# Patient Record
Sex: MALE | Race: WHITE | NOT HISPANIC OR LATINO | Employment: OTHER | URBAN - METROPOLITAN AREA
[De-identification: names, ages, dates, MRNs, and addresses within clinical notes are randomized per-mention and may not be internally consistent; named-entity substitution may affect disease eponyms.]

---

## 2019-06-21 ENCOUNTER — TRANSFERRED RECORDS (OUTPATIENT)
Dept: HEALTH INFORMATION MANAGEMENT | Facility: CLINIC | Age: 84
End: 2019-06-21

## 2019-07-19 ENCOUNTER — TRANSFERRED RECORDS (OUTPATIENT)
Dept: HEALTH INFORMATION MANAGEMENT | Facility: CLINIC | Age: 84
End: 2019-07-19

## 2019-07-19 DIAGNOSIS — M25.562 KNEE PAIN, LEFT: ICD-10-CM

## 2019-07-19 PROCEDURE — 73562 X-RAY EXAM OF KNEE 3: CPT | Mod: TC,LT,FY

## 2024-01-01 ENCOUNTER — HOSPITAL ENCOUNTER (EMERGENCY)
Facility: HOSPITAL | Age: 89
Discharge: ANOTHER HOSPITAL | End: 2024-12-28
Attending: NURSE PRACTITIONER | Admitting: NURSE PRACTITIONER
Payer: MEDICARE

## 2024-01-01 VITALS
RESPIRATION RATE: 25 BRPM | TEMPERATURE: 99.6 F | OXYGEN SATURATION: 94 % | SYSTOLIC BLOOD PRESSURE: 103 MMHG | DIASTOLIC BLOOD PRESSURE: 77 MMHG | HEART RATE: 66 BPM

## 2024-01-01 DIAGNOSIS — A41.9 SEPSIS (H): ICD-10-CM

## 2024-01-01 DIAGNOSIS — R33.9 URINARY RETENTION: ICD-10-CM

## 2024-01-01 DIAGNOSIS — K56.41 FECAL IMPACTION (H): ICD-10-CM

## 2024-01-01 LAB
ALBUMIN SERPL BCG-MCNC: 3.7 G/DL (ref 3.5–5.2)
ALBUMIN UR-MCNC: 20 MG/DL
ALP SERPL-CCNC: 50 U/L (ref 40–150)
ALT SERPL W P-5'-P-CCNC: 12 U/L (ref 0–70)
ANION GAP SERPL CALCULATED.3IONS-SCNC: 12 MMOL/L (ref 7–15)
APPEARANCE UR: ABNORMAL
AST SERPL W P-5'-P-CCNC: 24 U/L (ref 0–45)
BACTERIA #/AREA URNS HPF: ABNORMAL /HPF
BASE EXCESS BLDV CALC-SCNC: 2 MMOL/L (ref -3–3)
BILIRUB SERPL-MCNC: 0.4 MG/DL
BILIRUB UR QL STRIP: NEGATIVE
BUN SERPL-MCNC: 32.1 MG/DL (ref 8–23)
CALCIUM SERPL-MCNC: 9.9 MG/DL (ref 8.8–10.4)
CHLORIDE SERPL-SCNC: 103 MMOL/L (ref 98–107)
COLOR UR AUTO: YELLOW
CREAT SERPL-MCNC: 1.03 MG/DL (ref 0.67–1.17)
EGFRCR SERPLBLD CKD-EPI 2021: 68 ML/MIN/1.73M2
ERYTHROCYTE [DISTWIDTH] IN BLOOD BY AUTOMATED COUNT: 12.3 % (ref 10–15)
GLUCOSE SERPL-MCNC: 144 MG/DL (ref 70–99)
GLUCOSE UR STRIP-MCNC: NEGATIVE MG/DL
HCO3 BLDV-SCNC: 25 MMOL/L (ref 21–28)
HCO3 SERPL-SCNC: 24 MMOL/L (ref 22–29)
HCT VFR BLD AUTO: 33.4 % (ref 40–53)
HGB BLD-MCNC: 10.9 G/DL (ref 13.3–17.7)
HGB UR QL STRIP: ABNORMAL
HOLD SPECIMEN: NORMAL
KETONES UR STRIP-MCNC: ABNORMAL MG/DL
LACTATE SERPL-SCNC: 1.2 MMOL/L (ref 0.7–2)
LEUKOCYTE ESTERASE UR QL STRIP: ABNORMAL
MCH RBC QN AUTO: 33.4 PG (ref 26.5–33)
MCHC RBC AUTO-ENTMCNC: 32.6 G/DL (ref 31.5–36.5)
MCV RBC AUTO: 103 FL (ref 78–100)
MUCOUS THREADS #/AREA URNS LPF: PRESENT /LPF
NITRATE UR QL: NEGATIVE
O2/TOTAL GAS SETTING VFR VENT: 21 %
OXYHGB MFR BLDV: 93 % (ref 70–75)
PCO2 BLDV: 34 MM HG (ref 40–50)
PH BLDV: 7.48 [PH] (ref 7.32–7.43)
PH UR STRIP: 7 [PH] (ref 4.7–8)
PLATELET # BLD AUTO: 201 10E3/UL (ref 150–450)
PO2 BLDV: 67 MM HG (ref 25–47)
POTASSIUM SERPL-SCNC: 3.6 MMOL/L (ref 3.4–5.3)
PROCALCITONIN SERPL IA-MCNC: 13.59 NG/ML
PROT SERPL-MCNC: 6.3 G/DL (ref 6.4–8.3)
RBC # BLD AUTO: 3.26 10E6/UL (ref 4.4–5.9)
RBC URINE: 79 /HPF
SAO2 % BLDV: 95 % (ref 70–75)
SODIUM SERPL-SCNC: 139 MMOL/L (ref 135–145)
SP GR UR STRIP: 1.02 (ref 1–1.03)
SQUAMOUS EPITHELIAL: 0 /HPF
TSH SERPL DL<=0.005 MIU/L-ACNC: 2.11 UIU/ML (ref 0.3–4.2)
UROBILINOGEN UR STRIP-MCNC: 3 MG/DL
WBC # BLD AUTO: 26.6 10E3/UL (ref 4–11)
WBC URINE: 6 /HPF

## 2024-01-01 PROCEDURE — 96367 TX/PROPH/DG ADDL SEQ IV INF: CPT

## 2024-01-01 PROCEDURE — 85027 COMPLETE CBC AUTOMATED: CPT | Performed by: NURSE PRACTITIONER

## 2024-01-01 PROCEDURE — 250N000011 HC RX IP 250 OP 636: Performed by: INTERNAL MEDICINE

## 2024-01-01 PROCEDURE — 250N000011 HC RX IP 250 OP 636: Performed by: NURSE PRACTITIONER

## 2024-01-01 PROCEDURE — 83605 ASSAY OF LACTIC ACID: CPT | Performed by: NURSE PRACTITIONER

## 2024-01-01 PROCEDURE — 87040 BLOOD CULTURE FOR BACTERIA: CPT | Performed by: NURSE PRACTITIONER

## 2024-01-01 PROCEDURE — 82805 BLOOD GASES W/O2 SATURATION: CPT | Performed by: NURSE PRACTITIONER

## 2024-01-01 PROCEDURE — 96366 THER/PROPH/DIAG IV INF ADDON: CPT

## 2024-01-01 PROCEDURE — 99285 EMERGENCY DEPT VISIT HI MDM: CPT | Mod: 25

## 2024-01-01 PROCEDURE — 96365 THER/PROPH/DIAG IV INF INIT: CPT | Mod: XU

## 2024-01-01 PROCEDURE — 87077 CULTURE AEROBIC IDENTIFY: CPT | Performed by: NURSE PRACTITIONER

## 2024-01-01 PROCEDURE — 96375 TX/PRO/DX INJ NEW DRUG ADDON: CPT

## 2024-01-01 PROCEDURE — 87181 SC STD AGAR DILUTION PER AGT: CPT | Performed by: NURSE PRACTITIONER

## 2024-01-01 PROCEDURE — 51702 INSERT TEMP BLADDER CATH: CPT

## 2024-01-01 PROCEDURE — 36415 COLL VENOUS BLD VENIPUNCTURE: CPT | Performed by: NURSE PRACTITIONER

## 2024-01-01 PROCEDURE — 99285 EMERGENCY DEPT VISIT HI MDM: CPT | Performed by: NURSE PRACTITIONER

## 2024-01-01 PROCEDURE — 87088 URINE BACTERIA CULTURE: CPT | Performed by: NURSE PRACTITIONER

## 2024-01-01 PROCEDURE — 84443 ASSAY THYROID STIM HORMONE: CPT | Performed by: NURSE PRACTITIONER

## 2024-01-01 PROCEDURE — 84132 ASSAY OF SERUM POTASSIUM: CPT | Performed by: NURSE PRACTITIONER

## 2024-01-01 PROCEDURE — 81003 URINALYSIS AUTO W/O SCOPE: CPT | Performed by: NURSE PRACTITIONER

## 2024-01-01 PROCEDURE — 84145 PROCALCITONIN (PCT): CPT | Performed by: NURSE PRACTITIONER

## 2024-01-01 PROCEDURE — 250N000009 HC RX 250: Performed by: NURSE PRACTITIONER

## 2024-01-01 RX ORDER — MEROPENEM 1 G/1
1 INJECTION, POWDER, FOR SOLUTION INTRAVENOUS ONCE
Status: COMPLETED | OUTPATIENT
Start: 2024-01-01 | End: 2024-01-01

## 2024-01-01 RX ORDER — LIDOCAINE HYDROCHLORIDE 20 MG/ML
JELLY TOPICAL
Status: COMPLETED | OUTPATIENT
Start: 2024-01-01 | End: 2024-01-01

## 2024-01-01 RX ORDER — SODIUM PHOSPHATE,MONO-DIBASIC 19G-7G/118
1 ENEMA (ML) RECTAL ONCE
Status: DISCONTINUED | OUTPATIENT
Start: 2024-01-01 | End: 2024-01-01

## 2024-01-01 RX ORDER — LORAZEPAM 2 MG/ML
0.5 INJECTION INTRAMUSCULAR ONCE
Status: COMPLETED | OUTPATIENT
Start: 2024-01-01 | End: 2024-01-01

## 2024-01-01 RX ORDER — VANCOMYCIN HYDROCHLORIDE
1500 ONCE
Status: COMPLETED | OUTPATIENT
Start: 2024-01-01 | End: 2024-01-01

## 2024-01-01 RX ORDER — IOPAMIDOL 755 MG/ML
65 INJECTION, SOLUTION INTRAVASCULAR ONCE
Status: COMPLETED | OUTPATIENT
Start: 2024-01-01 | End: 2024-01-01

## 2024-01-01 RX ADMIN — LIDOCAINE HYDROCHLORIDE: 20 JELLY TOPICAL at 22:35

## 2024-01-01 RX ADMIN — MEROPENEM 1 G: 1 INJECTION, POWDER, FOR SOLUTION INTRAVENOUS at 20:09

## 2024-01-01 RX ADMIN — IOPAMIDOL 65 ML: 755 INJECTION, SOLUTION INTRAVENOUS at 20:44

## 2024-01-01 RX ADMIN — Medication 1500 MG: at 21:04

## 2024-01-01 RX ADMIN — LORAZEPAM 0.5 MG: 2 INJECTION INTRAMUSCULAR; INTRAVENOUS at 02:19

## 2024-01-01 ASSESSMENT — COLUMBIA-SUICIDE SEVERITY RATING SCALE - C-SSRS
IS THE PATIENT NOT ABLE TO COMPLETE C-SSRS: UNABLE TO VERBALIZE
IS THE PATIENT NOT ABLE TO COMPLETE C-SSRS: UNABLE TO VERBALIZE

## 2024-01-01 ASSESSMENT — ACTIVITIES OF DAILY LIVING (ADL)
ADLS_ACUITY_SCORE: 41

## 2024-10-07 PROBLEM — Z85.828 HISTORY OF NONMELANOMA SKIN CANCER: Status: ACTIVE | Noted: 2017-06-20

## 2024-10-07 PROBLEM — K21.9 GERD (GASTROESOPHAGEAL REFLUX DISEASE): Status: ACTIVE | Noted: 2024-10-07

## 2024-10-07 RX ORDER — LEVOTHYROXINE SODIUM 125 UG/1
62.5 TABLET ORAL DAILY
COMMUNITY
Start: 2015-12-07

## 2024-10-07 RX ORDER — OMEPRAZOLE 40 MG/1
40 CAPSULE, DELAYED RELEASE ORAL DAILY
COMMUNITY
Start: 2015-12-07 | End: 2024-11-11

## 2024-10-07 RX ORDER — SUCRALFATE ORAL 1 G/10ML
1 SUSPENSION ORAL DAILY
COMMUNITY
End: 2024-11-11

## 2024-10-07 RX ORDER — HYDROCORTISONE 25 MG/G
CREAM TOPICAL
COMMUNITY
Start: 2019-02-05 | End: 2024-11-11

## 2024-10-07 RX ORDER — ATORVASTATIN CALCIUM 20 MG/1
20 TABLET, FILM COATED ORAL DAILY
COMMUNITY
End: 2024-11-11

## 2024-10-07 RX ORDER — NITROGLYCERIN 0.4 MG/1
0.4 TABLET SUBLINGUAL EVERY 5 MIN PRN
COMMUNITY
Start: 2016-11-23 | End: 2024-11-11

## 2024-10-07 RX ORDER — DIPHENOXYLATE HYDROCHLORIDE AND ATROPINE SULFATE 2.5; .025 MG/1; MG/1
TABLET ORAL
COMMUNITY
Start: 2004-11-30 | End: 2024-11-11

## 2024-10-07 RX ORDER — DUTASTERIDE 0.5 MG/1
0.5 CAPSULE, LIQUID FILLED ORAL DAILY
COMMUNITY
Start: 2017-09-14 | End: 2024-11-11

## 2024-10-07 RX ORDER — AMPICILLIN TRIHYDRATE 500 MG
1000 CAPSULE ORAL
COMMUNITY
End: 2024-11-11

## 2024-10-07 RX ORDER — ISOSORBIDE MONONITRATE 30 MG/1
30 TABLET, EXTENDED RELEASE ORAL DAILY
COMMUNITY
Start: 2016-02-23 | End: 2024-11-11

## 2024-10-07 NOTE — PROGRESS NOTES
Nursing Home Initial Visit    HISTORY OF PRESENT ILLNESS:  Silas is a 89 year old male (5/17/1932)  resident of [facility] who is being seen today for initial Nursing Home Visit.     He has a past medical history significant for, but not limited to, moderate Alzheimer's disease,, coronary heart disease, hypertension, VHD (valvular heart disease) with severe aortic stenosis, history of cerebrovascular accident, dyslipidemina, GERD (gastroesophageal reflux disease), benign prostatic hypertrophy with obstruction, benign prostatic hypertrophy with urinary obstruction, as well as advanced age.    He was admitted from Assisted Living Facility after having increasing difficulties with Activities of Daily Living.      Advanced Directives:  POLST DNAR    The patient notes no complaints.    Discussed with nursing staff who note other concerns.    The patient is seen in his room accompanied by facility nurse.  Family is not present.     Medical records are reviewed.    Current medications, allergies, and interdisciplinary care plan are reviewed.      Patient Active Problem List    Diagnosis Date Noted    Acute respiratory failure with hypoxia (H) 05/19/2024     Priority: High    Aspiration pneumonia of right middle lobe (H) 05/18/2024     Priority: High    Symptomatic severe aortic stenosis with normal ejection fraction 11/28/2023     Priority: High    History of CVA (cerebrovascular accident) 05/25/2023     Priority: High    Dementia (H) 05/08/2023     Priority: High    LAFB (left anterior fascicular block) 03/14/2022     Priority: High    RBBB 03/14/2022     Priority: High    Coronary atherosclerosis of native coronary artery 07/15/2009     Priority: High     IMO Update 10/11      Essential hypertension 06/15/2009     Priority: High    GERD (gastroesophageal reflux disease) 10/07/2024     Priority: Medium    Transient neurologic deficit 04/27/2023     Priority: Medium    Hypomagnesemia 01/30/2023      Priority: Medium    Metabolic encephalopathy 11/09/2020     Priority: Medium    Severe sepsis without septic shock (H) 11/09/2020     Priority: Medium    History of nonmelanoma skin cancer 06/20/2017     Priority: Medium     SCC in situ left neck 2016      Lumbar and sacral spondyloarthritis 03/03/2014     Priority: Medium    Actinic keratosis 06/17/2011     Priority: Medium    Other psoriasis 06/17/2011     Priority: Medium    Elevated TSH 09/16/2009     Priority: Medium     IMO Update 10/11      Hyperlipidemia 09/16/2009     Priority: Medium     IMO Update 10/11      Benign prostatic hyperplasia with urinary obstruction 07/15/2009     Priority: Medium     Updated per 10/1/17 IMO import      Nursing Home Visit 10/24/2024     Priority: Low    Person living in residential institution 05/25/2023     Priority: Low    Health care directive on file 03/26/2014     Priority: Low          Social History     Socioeconomic History    Marital status:      Spouse name: Not on file    Number of children: Not on file    Years of education: Not on file    Highest education level: Not on file   Occupational History    Not on file   Tobacco Use    Smoking status: Not on file    Smokeless tobacco: Not on file   Substance and Sexual Activity    Alcohol use: Not on file    Drug use: Not on file    Sexual activity: Not on file   Other Topics Concern    Not on file   Social History Narrative    Not on file     Social Drivers of Health     Financial Resource Strain: Not on file   Food Insecurity: No Food Insecurity (5/19/2024)    Received from [facility]    Hunger Vital Sign     Worried About Running Out of Food in the Last Year: Never true     Ran Out of Food in the Last Year: Never true   Transportation Needs: No Transportation Needs (5/19/2024)    Received from [facility]    PRAPARE - Transportation     Lack of Transportation (Medical): No     Lack of  Transportation (Non-Medical): No   Physical Activity: Insufficiently Active (9/19/2023)    [facility]    Exercise Vital Sign     Days of Exercise per Week: 7 days     Minutes of Exercise per Session: 20 min   Stress: No Stress Concern Present (9/19/2023)    Received from [facility]    [place] of Occupational Health - Occupational Stress Questionnaire     Feeling of Stress : Not at all   Social Connections: Socially Isolated (9/19/2023)    [facility] [employer] [facility]    Social Connection and Isolation Panel [NHANES]     Frequency of Communication with Friends and Family: Once a week     Frequency of Social Gatherings with Friends and Family: More than three times a week     Attends Mu-ism Services: Never     Active Member of Clubs or Organizations: No     Attends Club or Organization Meetings: Never     Marital Status:    Interpersonal Safety: Not At Risk (5/19/2024)    Received from [facility] [employer] [facility] [employer] [facility] IP Custom IPV     Do you feel UNSAFE in any of your personal relationships with your family members or any other acquaintances?: No   Housing Stability: Low Risk  (5/19/2024)    [facility] [employer] [facility]    Housing Stability Vital Sign     Unable to Pay for Housing in the Last Year: No     Number of Times Moved in the Last Year: 0     Homeless in the Last Year: No        Current Outpatient Medications   Medication Sig Dispense Refill    dutasteride (AVODART) 0.5 MG capsule Take 0.5 mg by mouth daily.      hydrocortisone 2.5 % cream Apply topically.      isosorbide mononitrate (IMDUR) 30 MG 24 hr tablet Take 30 mg by mouth daily.      levothyroxine (SYNTHROID/LEVOTHROID) 50 MCG tablet Take 50 mcg by mouth daily.      Multiple Vitamin (MULTI-VITAMINS) TABS Take by mouth.      nitroGLYcerin (NITROSTAT) 0.4 MG  sublingual tablet Place 0.4 mg under the tongue every 5 minutes as needed.      Omega-3 Fatty Acids (OMEGA-3 PLUS) 1000 MG CAPS 2 caps a day      omeprazole (PRILOSEC) 40 MG DR capsule Take 40 mg by mouth daily.      atorvastatin (LIPITOR) 20 MG tablet Take 20 mg by mouth daily.      Cholecalciferol (D 1000) 25 MCG (1000 UT) CAPS Take 1,000 Units by mouth.      sucralfate (CARAFATE) 1 GM/10ML suspension Take 1 g by mouth daily.       No current facility-administered medications for this visit.       Allergies   Allergen Reactions    Promethazine Confusion    Cephalexin Hives       I have reviewed the MDS and I have reviewed the care plan and do agree with the plan.      ROS:  Review of Systems   Constitutional:  Negative for appetite change, fatigue and unexpected weight change.   HENT:   Negative for hearing loss, trouble swallowing and voice change.    Eyes:  Negative for eye problems.   Respiratory:  Negative for chest tightness, cough, hemoptysis, shortness of breath and wheezing.    Cardiovascular:  Negative for chest pain, leg swelling and palpitations.   Gastrointestinal:  Negative for abdominal pain, blood in stool, constipation, diarrhea, nausea and vomiting.   Genitourinary:  Negative for bladder incontinence, difficulty urinating, dysuria, frequency, hematuria and nocturia.    Musculoskeletal:  Negative for arthralgias, back pain, gait problem, myalgias and neck pain.   Skin:  Negative for itching, rash and wound.   Neurological:  Negative for dizziness, extremity weakness, gait problem, light-headedness, numbness, seizures and speech difficulty.   Hematological:  Negative for adenopathy.   Psychiatric/Behavioral:  Negative for depression and sleep disturbance. The patient is not nervous/anxious.          OBJECTIVE:  There were no vitals taken for this visit.    Physical Exam  Constitutional:       General: He is not in acute distress.  HENT:      Head: Normocephalic and atraumatic.      Right Ear:  External ear normal.      Left Ear: External ear normal.      Nose: Nose normal.      Mouth/Throat:      Mouth: Mucous membranes are moist.   Eyes:      Extraocular Movements: Extraocular movements intact.      Conjunctiva/sclera: Conjunctivae normal.      Pupils: Pupils are equal, round, and reactive to light.   Cardiovascular:      Rate and Rhythm: Normal rate and regular rhythm.      Heart sounds: Normal heart sounds. No murmur heard.     No friction rub. No gallop.   Pulmonary:      Effort: Pulmonary effort is normal.      Breath sounds: Normal breath sounds.   Abdominal:      General: Abdomen is flat. Bowel sounds are normal.   Musculoskeletal:      Right lower leg: No edema.      Left lower leg: No edema.   Skin:     General: Skin is warm and dry.      Findings: No lesion or rash.   Neurological:      General: No focal deficit present.      Mental Status: He is alert. Mental status is at baseline.   Psychiatric:         Mood and Affect: Mood normal.         Lab/Diagnostic data:    Reviewed    ASSESSMENT/ORDERS:  Nursing Home Visit  Discussed with staff. Care plan reviewed and orders updated.  Chronic issues are stable. Routine 30 day Nursing Home follow up.      Moderate late onset Alzheimer's dementia without behavioral disturbance, psychotic disturbance, mood disturbance, or anxiety (H)  Stable.  Will follow.    Atherosclerosis of native coronary artery of native heart without angina pectoris  Most recent EKG, echocardiogram, and coronary heart disease risk factors reviewed. Continue current medication regimen.    Essential hypertension  Available blood pressure readings are reviewed. Will continue same antihypertensive regimen. Monitor electrolytes and renal function every 6 months.    LAFB (left anterior fascicular block)  Stable    RBBB  Stable    Symptomatic severe aortic stenosis with normal ejection fraction  Significant.  Will follow.    History of CVA (cerebrovascular accident)  Stable    Other  psoriasis  Contolled on hydrocortisone cream    Mixed hyperlipidemia  Most recent lipid profile reviewed.  Role of medication therapy in the elderly discussed. Will follow LFT every 6 months with annual lipid profile.    Elevated TSH  Will follow every 3 months    Gastroesophageal reflux disease without esophagitis  On omeprazole.  Will follow.    Benign prostatic hyperplasia with urinary obstruction  On Avodart.  Will continue    Lumbar and sacral spondyloarthritis  Controlled.        Total time spent on the day of service was 50 min including patient visit, review of pertinent clinical information, treatment plan, and documentation.      Cali Chakraborty MD FAAFP DC CMD  Geriatrics  Hospice and Palliative Care    Post Medication Reconciliation Status:  Discharge medications reconciled, continue medications without change

## 2024-10-08 ENCOUNTER — NURSING HOME VISIT (OUTPATIENT)
Dept: GERIATRICS | Facility: OTHER | Age: 89
End: 2024-10-08
Attending: FAMILY MEDICINE
Payer: MEDICARE

## 2024-10-08 DIAGNOSIS — N40.1 BENIGN PROSTATIC HYPERPLASIA WITH URINARY OBSTRUCTION: ICD-10-CM

## 2024-10-08 DIAGNOSIS — F02.B0 MODERATE LATE ONSET ALZHEIMER'S DEMENTIA WITHOUT BEHAVIORAL DISTURBANCE, PSYCHOTIC DISTURBANCE, MOOD DISTURBANCE, OR ANXIETY (H): ICD-10-CM

## 2024-10-08 DIAGNOSIS — I44.4 LAFB (LEFT ANTERIOR FASCICULAR BLOCK): ICD-10-CM

## 2024-10-08 DIAGNOSIS — Z86.73 HISTORY OF CVA (CEREBROVASCULAR ACCIDENT): ICD-10-CM

## 2024-10-08 DIAGNOSIS — I45.10 RBBB: ICD-10-CM

## 2024-10-08 DIAGNOSIS — I25.10 ATHEROSCLEROSIS OF NATIVE CORONARY ARTERY OF NATIVE HEART WITHOUT ANGINA PECTORIS: ICD-10-CM

## 2024-10-08 DIAGNOSIS — Z78.9 NURSING HOME RESIDENT: Primary | ICD-10-CM

## 2024-10-08 DIAGNOSIS — G30.1 MODERATE LATE ONSET ALZHEIMER'S DEMENTIA WITHOUT BEHAVIORAL DISTURBANCE, PSYCHOTIC DISTURBANCE, MOOD DISTURBANCE, OR ANXIETY (H): ICD-10-CM

## 2024-10-08 DIAGNOSIS — I35.0 SYMPTOMATIC SEVERE AORTIC STENOSIS WITH NORMAL EJECTION FRACTION: ICD-10-CM

## 2024-10-08 DIAGNOSIS — E78.2 MIXED HYPERLIPIDEMIA: ICD-10-CM

## 2024-10-08 DIAGNOSIS — N13.8 BENIGN PROSTATIC HYPERPLASIA WITH URINARY OBSTRUCTION: ICD-10-CM

## 2024-10-08 DIAGNOSIS — K21.9 GASTROESOPHAGEAL REFLUX DISEASE WITHOUT ESOPHAGITIS: ICD-10-CM

## 2024-10-08 DIAGNOSIS — I10 ESSENTIAL HYPERTENSION: ICD-10-CM

## 2024-10-08 DIAGNOSIS — R79.89 ELEVATED TSH: ICD-10-CM

## 2024-10-08 DIAGNOSIS — L40.8 OTHER PSORIASIS: ICD-10-CM

## 2024-10-08 DIAGNOSIS — M47.817 LUMBAR AND SACRAL SPONDYLOARTHRITIS: ICD-10-CM

## 2024-10-08 PROCEDURE — 99306 1ST NF CARE HIGH MDM 50: CPT | Performed by: FAMILY MEDICINE

## 2024-10-09 ENCOUNTER — TELEPHONE (OUTPATIENT)
Dept: GERIATRICS | Facility: OTHER | Age: 89
End: 2024-10-09

## 2024-10-09 PROBLEM — I44.4 LAFB (LEFT ANTERIOR FASCICULAR BLOCK): Status: ACTIVE | Noted: 2022-03-14

## 2024-10-09 PROBLEM — J69.0: Status: ACTIVE | Noted: 2024-05-18

## 2024-10-09 PROBLEM — F03.90 DEMENTIA (H): Status: ACTIVE | Noted: 2023-05-08

## 2024-10-09 PROBLEM — I45.10 RBBB: Status: ACTIVE | Noted: 2022-03-14

## 2024-10-09 PROBLEM — A41.9 SEVERE SEPSIS WITHOUT SEPTIC SHOCK (H): Status: ACTIVE | Noted: 2020-11-09

## 2024-10-09 PROBLEM — R29.818 TRANSIENT NEUROLOGIC DEFICIT: Status: ACTIVE | Noted: 2023-04-27

## 2024-10-09 PROBLEM — G93.41 METABOLIC ENCEPHALOPATHY: Status: ACTIVE | Noted: 2020-11-09

## 2024-10-09 PROBLEM — Z78.9 PERSON LIVING IN RESIDENTIAL INSTITUTION: Status: ACTIVE | Noted: 2023-05-25

## 2024-10-09 PROBLEM — Z86.73 HISTORY OF CVA (CEREBROVASCULAR ACCIDENT): Status: ACTIVE | Noted: 2023-05-25

## 2024-10-09 PROBLEM — R65.20 SEVERE SEPSIS WITHOUT SEPTIC SHOCK (H): Status: ACTIVE | Noted: 2020-11-09

## 2024-10-09 PROBLEM — I35.0 SYMPTOMATIC SEVERE AORTIC STENOSIS WITH NORMAL EJECTION FRACTION: Status: ACTIVE | Noted: 2023-11-28

## 2024-10-09 PROBLEM — J96.01 ACUTE RESPIRATORY FAILURE WITH HYPOXIA (H): Status: ACTIVE | Noted: 2024-05-19

## 2024-10-09 PROBLEM — E83.42 HYPOMAGNESEMIA: Status: ACTIVE | Noted: 2023-01-30

## 2024-10-10 NOTE — TELEPHONE ENCOUNTER
Approved by Dr. Chakraborty  Notification sent to [facility] [place].  Vivienne Mackey, [facility]

## 2024-10-24 PROBLEM — Z78.9 NURSING HOME RESIDENT: Status: ACTIVE | Noted: 2024-10-24

## 2024-10-24 ASSESSMENT — ENCOUNTER SYMPTOMS
PALPITATIONS: 0
TROUBLE SWALLOWING: 0
NERVOUS/ANXIOUS: 0
ABDOMINAL PAIN: 0
APPETITE CHANGE: 0
DYSURIA: 0
BACK PAIN: 0
SEIZURES: 0
WOUND: 0
NUMBNESS: 0
CHEST TIGHTNESS: 0
DIARRHEA: 0
DEPRESSION: 0
VOICE CHANGE: 0
EXTREMITY WEAKNESS: 0
ADENOPATHY: 0
EYE PROBLEMS: 0
DIZZINESS: 0
HEMATURIA: 0
MYALGIAS: 0
VOMITING: 0
SHORTNESS OF BREATH: 0
SPEECH DIFFICULTY: 0
COUGH: 0
ARTHRALGIAS: 0
CONSTIPATION: 0
LIGHT-HEADEDNESS: 0
UNEXPECTED WEIGHT CHANGE: 0
NAUSEA: 0
HEMOPTYSIS: 0
NECK PAIN: 0
FREQUENCY: 0
BLOOD IN STOOL: 0
DIFFICULTY URINATING: 0
SLEEP DISTURBANCE: 0
WHEEZING: 0
FATIGUE: 0
LEG SWELLING: 0

## 2024-11-07 ENCOUNTER — TELEPHONE (OUTPATIENT)
Dept: GERIATRICS | Facility: OTHER | Age: 89
End: 2024-11-07

## 2024-11-11 VITALS
TEMPERATURE: 96.4 F | HEIGHT: 62 IN | DIASTOLIC BLOOD PRESSURE: 65 MMHG | WEIGHT: 130.6 LBS | RESPIRATION RATE: 18 BRPM | HEART RATE: 82 BPM | OXYGEN SATURATION: 96 % | BODY MASS INDEX: 24.03 KG/M2 | SYSTOLIC BLOOD PRESSURE: 133 MMHG

## 2024-11-11 RX ORDER — ASPIRIN 81 MG/1
81 TABLET ORAL DAILY
COMMUNITY

## 2024-11-11 RX ORDER — SENNOSIDES A AND B 8.6 MG/1
1 TABLET, FILM COATED ORAL 2 TIMES DAILY
COMMUNITY

## 2024-11-11 RX ORDER — HYDROCORTISONE 10 MG/G
CREAM TOPICAL 2 TIMES DAILY PRN
COMMUNITY

## 2024-11-11 RX ORDER — BISACODYL 10 MG
10 SUPPOSITORY, RECTAL RECTAL DAILY PRN
COMMUNITY

## 2024-11-11 RX ORDER — NYSTATIN 100000 U/G
CREAM TOPICAL 2 TIMES DAILY PRN
COMMUNITY

## 2024-11-11 RX ORDER — CALCIUM CARBONATE 500 MG/1
1 TABLET, CHEWABLE ORAL 2 TIMES DAILY PRN
COMMUNITY

## 2024-11-11 RX ORDER — PANTOPRAZOLE SODIUM 40 MG/1
40 TABLET, DELAYED RELEASE ORAL DAILY
COMMUNITY

## 2024-11-11 RX ORDER — RANOLAZINE 500 MG/1
500 TABLET, EXTENDED RELEASE ORAL 2 TIMES DAILY
COMMUNITY

## 2024-11-11 RX ORDER — MAGNESIUM OXIDE 400 MG/1
400 TABLET ORAL DAILY
COMMUNITY

## 2024-11-11 RX ORDER — FINASTERIDE 5 MG/1
5 TABLET, FILM COATED ORAL DAILY
COMMUNITY

## 2024-11-11 RX ORDER — ACETAMINOPHEN 500 MG
1000 TABLET ORAL DAILY PRN
COMMUNITY

## 2024-11-11 RX ORDER — CLOPIDOGREL BISULFATE 75 MG/1
75 TABLET ORAL DAILY
COMMUNITY

## 2024-11-11 RX ORDER — ACETAMINOPHEN 500 MG
1000 TABLET ORAL 3 TIMES DAILY
COMMUNITY

## 2024-11-12 ENCOUNTER — NURSING HOME VISIT (OUTPATIENT)
Dept: GERIATRICS | Facility: OTHER | Age: 89
End: 2024-11-12
Attending: FAMILY MEDICINE
Payer: MEDICARE

## 2024-11-12 DIAGNOSIS — I10 ESSENTIAL HYPERTENSION: ICD-10-CM

## 2024-11-12 DIAGNOSIS — I35.0 SYMPTOMATIC SEVERE AORTIC STENOSIS WITH NORMAL EJECTION FRACTION: ICD-10-CM

## 2024-11-12 DIAGNOSIS — R79.89 ELEVATED TSH: ICD-10-CM

## 2024-11-12 DIAGNOSIS — Z78.9 NURSING HOME RESIDENT: Primary | ICD-10-CM

## 2024-11-12 DIAGNOSIS — F02.B0 MODERATE LATE ONSET ALZHEIMER'S DEMENTIA WITHOUT BEHAVIORAL DISTURBANCE, PSYCHOTIC DISTURBANCE, MOOD DISTURBANCE, OR ANXIETY (H): ICD-10-CM

## 2024-11-12 DIAGNOSIS — I25.10 ATHEROSCLEROSIS OF NATIVE CORONARY ARTERY OF NATIVE HEART WITHOUT ANGINA PECTORIS: ICD-10-CM

## 2024-11-12 DIAGNOSIS — G30.1 MODERATE LATE ONSET ALZHEIMER'S DEMENTIA WITHOUT BEHAVIORAL DISTURBANCE, PSYCHOTIC DISTURBANCE, MOOD DISTURBANCE, OR ANXIETY (H): ICD-10-CM

## 2024-11-12 PROCEDURE — 99308 SBSQ NF CARE LOW MDM 20: CPT | Performed by: FAMILY MEDICINE

## 2024-11-12 ASSESSMENT — ENCOUNTER SYMPTOMS
TROUBLE SWALLOWING: 0
DYSURIA: 0
APPETITE CHANGE: 0
FATIGUE: 0
DEPRESSION: 0
NERVOUS/ANXIOUS: 0
ARTHRALGIAS: 0
HEMATURIA: 0
SLEEP DISTURBANCE: 0
CONSTIPATION: 0
VOICE CHANGE: 0
SPEECH DIFFICULTY: 0
BACK PAIN: 0
HEMOPTYSIS: 0
MYALGIAS: 0
NECK PAIN: 0
DIARRHEA: 0
EYE PROBLEMS: 0
DIFFICULTY URINATING: 0
VOMITING: 0
SHORTNESS OF BREATH: 0
NAUSEA: 0
ABDOMINAL PAIN: 0
COUGH: 0
FREQUENCY: 0
CHEST TIGHTNESS: 0
WOUND: 0
PALPITATIONS: 0
UNEXPECTED WEIGHT CHANGE: 0
ADENOPATHY: 0
WHEEZING: 0
LIGHT-HEADEDNESS: 0
NUMBNESS: 0
DIZZINESS: 0
LEG SWELLING: 0
SEIZURES: 0
BLOOD IN STOOL: 0
EXTREMITY WEAKNESS: 0

## 2024-11-12 NOTE — PROGRESS NOTES
Nursing Home Visit    HISTORY OF PRESENT ILLNESS:  Silas is a 89 year old male (5/17/1932)  resident of [facility] who is being seen today for initial Nursing Home Visit.     He has a past medical history significant for, but not limited to, moderate Alzheimer's disease,, coronary heart disease, hypertension, VHD (valvular heart disease) with severe aortic stenosis, history of cerebrovascular accident, dyslipidemina, GERD (gastroesophageal reflux disease), benign prostatic hypertrophy with obstruction, benign prostatic hypertrophy with urinary obstruction, as well as advanced age.    He was admitted from Assisted Living Facility after having increasing difficulties with Activities of Daily Living.      Advanced Directives:  POLST DNAR    The patient notes no complaints.    Discussed with nursing staff who note other concerns.    The patient is seen in his room accompanied by facility nurse.  Family is not present.     Medical records are reviewed.    Current medications, allergies, and interdisciplinary care plan are reviewed.      Patient Active Problem List    Diagnosis Date Noted     Acute respiratory failure with hypoxia (H) 05/19/2024     Priority: High     Aspiration pneumonia of right middle lobe (H) 05/18/2024     Priority: High     Symptomatic severe aortic stenosis with normal ejection fraction 11/28/2023     Priority: High     History of CVA (cerebrovascular accident) 05/25/2023     Priority: High     Dementia (H) 05/08/2023     Priority: High     LAFB (left anterior fascicular block) 03/14/2022     Priority: High     RBBB 03/14/2022     Priority: High     Coronary atherosclerosis of native coronary artery 07/15/2009     Priority: High     IMO Update 10/11       Essential hypertension 06/15/2009     Priority: High     GERD (gastroesophageal reflux disease) 10/07/2024     Priority: Medium     Transient neurologic deficit 04/27/2023     Priority: Medium     Hypomagnesemia 01/30/2023      Priority: Medium     Metabolic encephalopathy 11/09/2020     Priority: Medium     Severe sepsis without septic shock (H) 11/09/2020     Priority: Medium     History of nonmelanoma skin cancer 06/20/2017     Priority: Medium     SCC in situ left neck 2016       Lumbar and sacral spondyloarthritis 03/03/2014     Priority: Medium     Actinic keratosis 06/17/2011     Priority: Medium     Other psoriasis 06/17/2011     Priority: Medium     Elevated TSH 09/16/2009     Priority: Medium     IMO Update 10/11       Hyperlipidemia 09/16/2009     Priority: Medium     IMO Update 10/11       Benign prostatic hyperplasia with urinary obstruction 07/15/2009     Priority: Medium     Updated per 10/1/17 IMO import       Nursing Home Visit 10/24/2024     Priority: Low     Person living in residential institution 05/25/2023     Priority: Low     Health care directive on file 03/26/2014     Priority: Low          Social History     Socioeconomic History     Marital status:      Spouse name: Not on file     Number of children: Not on file     Years of education: Not on file     Highest education level: Not on file   Occupational History     Not on file   Tobacco Use     Smoking status: Not on file     Smokeless tobacco: Not on file   Substance and Sexual Activity     Alcohol use: Not on file     Drug use: Not on file     Sexual activity: Not on file   Other Topics Concern     Not on file   Social History Narrative     Not on file     Social Drivers of Health     Financial Resource Strain: Not on file   Food Insecurity: No Food Insecurity (5/19/2024)    [facility]    Hunger Vital Sign      Worried About Running Out of Food in the Last Year: Never true      Ran Out of Food in the Last Year: Never true   Transportation Needs: No Transportation Needs (5/19/2024)    [facility] from [facility]    PRAPARE - Transportation      Lack of Transportation  (Medical): No      Lack of Transportation (Non-Medical): No   Physical Activity: Insufficiently Active (9/19/2023)    Received from [employer] [facility]    Exercise Vital Sign      Days of Exercise per Week: 7 days      Minutes of Exercise per Session: 20 min   Stress: No Stress Concern Present (9/19/2023)    Received from [facility] [facility]    [place] of Occupational Health - Occupational Stress Questionnaire      Feeling of Stress : Not at all   Social Connections: Socially Isolated (9/19/2023)    [facility] [employer] [facility]    Social Connection and Isolation Panel [NHANES]      Frequency of Communication with Friends and Family: Once a week      Frequency of Social Gatherings with Friends and Family: More than three times a week      Attends Buddhist Services: Never      Active Member of Clubs or Organizations: No      Attends Club or Organization Meetings: Never      Marital Status:    Interpersonal Safety: Not At Risk (11/7/2024)    [facility] [employer] [facility] [employer] [facility]    EH IP Custom IPV      Do you feel UNSAFE in any of your personal relationships with your family members or any other acquaintances?: No   Housing Stability: Low Risk  (5/19/2024)    [facility] [employer] [facility] [employer] [facility]    Housing Stability Vital Sign      Unable to Pay for Housing in the Last Year: No      Number of Times Moved in the Last Year: 0      Homeless in the Last Year: No        Current Outpatient Medications   Medication Sig Dispense Refill     acetaminophen (TYLENOL) 500 MG tablet Take 1,000 mg by mouth 3 times daily.       acetaminophen (TYLENOL) 500 MG tablet Take 1,000 mg by mouth daily as needed for mild pain.       aspirin 81 MG EC tablet Take 81 mg by mouth daily.       bisacodyl (DULCOLAX) 10 MG suppository Place 10 mg rectally daily as needed for constipation.       calcium  carbonate (TUMS) 500 MG chewable tablet Take 1 chew tab by mouth 2 times daily as needed for heartburn.       clopidogrel (PLAVIX) 75 MG tablet Take 75 mg by mouth daily.       finasteride (PROSCAR) 5 MG tablet Take 5 mg by mouth daily.       hydrocortisone 1 % CREA cream Place rectally 2 times daily as needed for itching.       levothyroxine (SYNTHROID/LEVOTHROID) 125 MCG tablet Take 62.5 mcg by mouth daily.       magnesium oxide (MAG-OX) 400 MG tablet Take 400 mg by mouth daily.       nystatin (MYCOSTATIN) 144825 UNIT/GM external cream Apply topically 2 times daily as needed for dry skin.       pantoprazole (PROTONIX) 40 MG EC tablet Take 40 mg by mouth daily.       ranolazine (RANEXA) 500 MG 12 hr tablet Take 500 mg by mouth 2 times daily.       senna (SENOKOT) 8.6 MG tablet Take 1 tablet by mouth 2 times daily.       No current facility-administered medications for this visit.       Allergies   Allergen Reactions     Promethazine Confusion     Cephalexin Hives       I have reviewed the MDS and I have reviewed the care plan and do agree with the plan.      ROS:  Review of Systems   Constitutional:  Negative for appetite change, fatigue and unexpected weight change.   HENT:   Negative for hearing loss, trouble swallowing and voice change.    Eyes:  Negative for eye problems.   Respiratory:  Negative for chest tightness, cough, hemoptysis, shortness of breath and wheezing.    Cardiovascular:  Negative for chest pain, leg swelling and palpitations.   Gastrointestinal:  Negative for abdominal pain, blood in stool, constipation, diarrhea, nausea and vomiting.   Genitourinary:  Negative for bladder incontinence, difficulty urinating, dysuria, frequency, hematuria and nocturia.    Musculoskeletal:  Negative for arthralgias, back pain, gait problem, myalgias and neck pain.   Skin:  Negative for itching, rash and wound.   Neurological:  Negative for dizziness, extremity weakness, gait problem, light-headedness, numbness,  "seizures and speech difficulty.   Hematological:  Negative for adenopathy.   Psychiatric/Behavioral:  Negative for depression and sleep disturbance. The patient is not nervous/anxious.          OBJECTIVE:  /65   Pulse 82   Temp (!) 96.4  F (35.8  C)   Resp 18   Ht 1.575 m (5' 2\")   Wt 59.2 kg (130 lb 9.6 oz)   SpO2 96%   BMI 23.89 kg/m      Physical Exam  Constitutional:       General: He is not in acute distress.  HENT:      Head: Normocephalic and atraumatic.      Right Ear: External ear normal.      Left Ear: External ear normal.      Nose: Nose normal.      Mouth/Throat:      Mouth: Mucous membranes are moist.   Eyes:      Extraocular Movements: Extraocular movements intact.      Conjunctiva/sclera: Conjunctivae normal.      Pupils: Pupils are equal, round, and reactive to light.   Cardiovascular:      Rate and Rhythm: Normal rate and regular rhythm.      Heart sounds: Normal heart sounds. No murmur heard.     No friction rub. No gallop.   Pulmonary:      Effort: Pulmonary effort is normal.      Breath sounds: Normal breath sounds.   Abdominal:      General: Abdomen is flat. Bowel sounds are normal.   Musculoskeletal:      Right lower leg: No edema.      Left lower leg: No edema.   Skin:     General: Skin is warm and dry.      Findings: No lesion or rash.   Neurological:      General: No focal deficit present.      Mental Status: He is alert. Mental status is at baseline.   Psychiatric:         Mood and Affect: Mood normal.       Lab/Diagnostic data:    Reviewed    ASSESSMENT/ORDERS:  Nursing Home Visit  Discussed with staff. Care plan reviewed and orders updated.  Chronic issues are stable. Routine 30 day Nursing Home follow up.      Moderate late onset Alzheimer's dementia without behavioral disturbance, psychotic disturbance, mood disturbance, or anxiety (H)  Stable.  Will follow.    Atherosclerosis of native coronary artery of native heart without angina pectoris  Most recent EKG, " echocardiogram, and coronary heart disease risk factors reviewed. Continue current medication regimen.    Essential hypertension  Available blood pressure readings are reviewed. Will continue same antihypertensive regimen. Monitor electrolytes and renal function every 6 months.      Symptomatic severe aortic stenosis with normal ejection fraction  Significant.  Will follow.      Elevated TSH  Will follow every 3 months        Total time spent on the day of service was 5min including patient visit, review of pertinent clinical information, treatment plan, and documentation.      Cali Chakraborty MD FAAFP DC CMD  Geriatrics  Hospice and Palliative Care    Post Medication Reconciliation Status:

## 2024-11-26 ENCOUNTER — TELEPHONE (OUTPATIENT)
Dept: GERIATRICS | Facility: OTHER | Age: 89
End: 2024-11-26

## 2024-11-30 ENCOUNTER — LAB REQUISITION (OUTPATIENT)
Dept: LAB | Facility: HOSPITAL | Age: 89
End: 2024-11-30
Payer: COMMERCIAL

## 2024-11-30 DIAGNOSIS — E03.9 HYPOTHYROIDISM, UNSPECIFIED: ICD-10-CM

## 2024-11-30 DIAGNOSIS — D64.9 ANEMIA, UNSPECIFIED: ICD-10-CM

## 2024-11-30 DIAGNOSIS — I10 ESSENTIAL (PRIMARY) HYPERTENSION: ICD-10-CM

## 2024-11-30 LAB
ALBUMIN UR-MCNC: NEGATIVE MG/DL
APPEARANCE UR: CLEAR
BILIRUB UR QL STRIP: NEGATIVE
COLOR UR AUTO: NORMAL
GLUCOSE UR STRIP-MCNC: NEGATIVE MG/DL
HGB UR QL STRIP: NEGATIVE
KETONES UR STRIP-MCNC: NEGATIVE MG/DL
LEUKOCYTE ESTERASE UR QL STRIP: NEGATIVE
NITRATE UR QL: NEGATIVE
PH UR STRIP: 7.5 [PH] (ref 4.7–8)
RBC URINE: 0 /HPF
SP GR UR STRIP: 1.01 (ref 1–1.03)
SQUAMOUS EPITHELIAL: 0 /HPF
UROBILINOGEN UR STRIP-MCNC: NORMAL MG/DL
WBC URINE: <1 /HPF

## 2024-11-30 PROCEDURE — 81001 URINALYSIS AUTO W/SCOPE: CPT | Performed by: FAMILY MEDICINE

## 2024-12-11 ENCOUNTER — HOSPITAL ENCOUNTER (EMERGENCY)
Facility: HOSPITAL | Age: 89
Discharge: HOME | End: 2024-12-11
Attending: NURSE PRACTITIONER
Payer: MEDICARE

## 2024-12-11 VITALS
TEMPERATURE: 97.8 F | DIASTOLIC BLOOD PRESSURE: 89 MMHG | HEART RATE: 87 BPM | SYSTOLIC BLOOD PRESSURE: 134 MMHG | WEIGHT: 132.72 LBS | OXYGEN SATURATION: 96 % | BODY MASS INDEX: 24.27 KG/M2 | RESPIRATION RATE: 16 BRPM

## 2024-12-11 DIAGNOSIS — F03.911 DEMENTIA WITH AGITATION, UNSPECIFIED DEMENTIA SEVERITY, UNSPECIFIED DEMENTIA TYPE (H): ICD-10-CM

## 2024-12-11 LAB
ANION GAP SERPL CALCULATED.3IONS-SCNC: 10 MMOL/L (ref 7–15)
BUN SERPL-MCNC: 22.7 MG/DL (ref 8–23)
CALCIUM SERPL-MCNC: 9.8 MG/DL (ref 8.8–10.4)
CHLORIDE SERPL-SCNC: 102 MMOL/L (ref 98–107)
CREAT SERPL-MCNC: 0.92 MG/DL (ref 0.67–1.17)
EGFRCR SERPLBLD CKD-EPI 2021: 78 ML/MIN/1.73M2
ERYTHROCYTE [DISTWIDTH] IN BLOOD BY AUTOMATED COUNT: 12.6 % (ref 10–15)
GLUCOSE SERPL-MCNC: 89 MG/DL (ref 70–99)
HCO3 SERPL-SCNC: 26 MMOL/L (ref 22–29)
HCT VFR BLD AUTO: 35.5 % (ref 40–53)
HGB BLD-MCNC: 11.5 G/DL (ref 13.3–17.7)
HOLD SPECIMEN: NORMAL
MCH RBC QN AUTO: 33.6 PG (ref 26.5–33)
MCHC RBC AUTO-ENTMCNC: 32.4 G/DL (ref 31.5–36.5)
MCV RBC AUTO: 104 FL (ref 78–100)
PLATELET # BLD AUTO: 195 10E3/UL (ref 150–450)
POTASSIUM SERPL-SCNC: 4.7 MMOL/L (ref 3.4–5.3)
RBC # BLD AUTO: 3.42 10E6/UL (ref 4.4–5.9)
SODIUM SERPL-SCNC: 138 MMOL/L (ref 135–145)
WBC # BLD AUTO: 5.6 10E3/UL (ref 4–11)

## 2024-12-11 PROCEDURE — 80048 BASIC METABOLIC PNL TOTAL CA: CPT | Performed by: NURSE PRACTITIONER

## 2024-12-11 PROCEDURE — 82374 ASSAY BLOOD CARBON DIOXIDE: CPT | Performed by: NURSE PRACTITIONER

## 2024-12-11 PROCEDURE — 99283 EMERGENCY DEPT VISIT LOW MDM: CPT

## 2024-12-11 PROCEDURE — 36415 COLL VENOUS BLD VENIPUNCTURE: CPT | Performed by: NURSE PRACTITIONER

## 2024-12-11 PROCEDURE — 99283 EMERGENCY DEPT VISIT LOW MDM: CPT | Performed by: NURSE PRACTITIONER

## 2024-12-11 PROCEDURE — 85027 COMPLETE CBC AUTOMATED: CPT | Performed by: NURSE PRACTITIONER

## 2024-12-11 ASSESSMENT — COLUMBIA-SUICIDE SEVERITY RATING SCALE - C-SSRS
6. HAVE YOU EVER DONE ANYTHING, STARTED TO DO ANYTHING, OR PREPARED TO DO ANYTHING TO END YOUR LIFE?: NO
1. IN THE PAST MONTH, HAVE YOU WISHED YOU WERE DEAD OR WISHED YOU COULD GO TO SLEEP AND NOT WAKE UP?: NO
2. HAVE YOU ACTUALLY HAD ANY THOUGHTS OF KILLING YOURSELF IN THE PAST MONTH?: NO

## 2024-12-11 ASSESSMENT — ACTIVITIES OF DAILY LIVING (ADL)
ADLS_ACUITY_SCORE: 41
ADLS_ACUITY_SCORE: 45
ADLS_ACUITY_SCORE: 41

## 2024-12-11 NOTE — ED TRIAGE NOTES
[facility] to give report on patient being sent in by ambulance with c/o agitation, aggression, paranoia, and delusional. Recently started on Zyprexa 12/4/24

## 2024-12-11 NOTE — ED TRIAGE NOTES
Arrived to ER room 3 via [facility] from [facility] per family request. Hx of dementia. EMS reports for awhile now patient has started to get delusions and aggressive behavior around noon each day. States today patient was stating the police were after him. EMS reports patient was pleasant and cooperative for them. Upon arrival here patient has been smiling, joking, and in good spirits. Denies pain. Changed into gown. Call light within reach. Daughter Christos at bedside.

## 2024-12-11 NOTE — ED PROVIDER NOTES
"  History     Chief Complaint   Patient presents with    Agitation    Dementia     HPI  Silas Fraser is a 89 year old individual with history of BPH, coronary atherosclerosis, GERD, hypertension, CVA on anticoagulation with clopidogrel, dementia, is brought in by EMS for evaluation of agitation.  Daughter states that she wants patient checked out as the past couple of days has been acting like a \"zombie\" and having periods of agitation which is new.  Daughter believes it is a medication induced as he was started on a new medication a few days ago and now behaviors are starting.  On arrival patient denies any complaints.  Daughter states patient is acting normal self at this time.  Daughter does state that patient she did speak with PCP but refused to discontinue the medication.    Allergies:  Allergies   Allergen Reactions    Promethazine Confusion    Cephalexin Hives       Problem List:    Patient Active Problem List    Diagnosis Date Noted    Nursing Home Visit 10/24/2024     Priority: Medium    GERD (gastroesophageal reflux disease) 10/07/2024     Priority: Medium    Acute respiratory failure with hypoxia (H) 05/19/2024     Priority: Medium    Aspiration pneumonia of right middle lobe (H) 05/18/2024     Priority: Medium    Symptomatic severe aortic stenosis with normal ejection fraction 11/28/2023     Priority: Medium    History of CVA (cerebrovascular accident) 05/25/2023     Priority: Medium    Person living in residential institution 05/25/2023     Priority: Medium    Dementia (H) 05/08/2023     Priority: Medium    Transient neurologic deficit 04/27/2023     Priority: Medium    Hypomagnesemia 01/30/2023     Priority: Medium    LAFB (left anterior fascicular block) 03/14/2022     Priority: Medium    RBBB 03/14/2022     Priority: Medium    Metabolic encephalopathy 11/09/2020     Priority: Medium    Severe sepsis without septic shock (H) 11/09/2020     Priority: Medium    History of nonmelanoma skin cancer " 06/20/2017     Priority: Medium     SCC in situ left neck 2016      Health care directive on file 03/26/2014     Priority: Medium    Lumbar and sacral spondyloarthritis 03/03/2014     Priority: Medium    Actinic keratosis 06/17/2011     Priority: Medium    Other psoriasis 06/17/2011     Priority: Medium    Elevated TSH 09/16/2009     Priority: Medium     IMO Update 10/11      Hyperlipidemia 09/16/2009     Priority: Medium     IMO Update 10/11      Benign prostatic hyperplasia with urinary obstruction 07/15/2009     Priority: Medium     Updated per 10/1/17 IMO import      Coronary atherosclerosis of native coronary artery 07/15/2009     Priority: Medium     IMO Update 10/11      Essential hypertension 06/15/2009     Priority: Medium        Past Medical History:    No past medical history on file.    Past Surgical History:    No past surgical history on file.    Family History:    No family history on file.    Social History:  Marital Status:   [2]        Medications:    acetaminophen (TYLENOL) 500 MG tablet  acetaminophen (TYLENOL) 500 MG tablet  aspirin 81 MG EC tablet  bisacodyl (DULCOLAX) 10 MG suppository  calcium carbonate (TUMS) 500 MG chewable tablet  clopidogrel (PLAVIX) 75 MG tablet  finasteride (PROSCAR) 5 MG tablet  hydrocortisone 1 % CREA cream  levothyroxine (SYNTHROID/LEVOTHROID) 125 MCG tablet  magnesium oxide (MAG-OX) 400 MG tablet  nystatin (MYCOSTATIN) 841979 UNIT/GM external cream  pantoprazole (PROTONIX) 40 MG EC tablet  ranolazine (RANEXA) 500 MG 12 hr tablet  senna (SENOKOT) 8.6 MG tablet          Review of Systems   Unable to perform ROS: Dementia       Physical Exam   BP: 138/77  Pulse: 83  Temp: 97.8  F (36.6  C)  Resp: 16  Weight: 60.2 kg (132 lb 11.5 oz)  SpO2: 92 %      GENERAL APPEARANCE:  The patient is a 89 year old well-developed, well-nourished individual that appears as stated age.  LUNGS:  Breathing is easy.  Breath sounds are equal and clear bilaterally.  No wheezes,  rhonchi, or rales.  HEART:  Regular rate and rhythm with normal S1 and S2.  No murmurs, gallops, or rubs.  ABDOMEN:  Soft.  No mass, tenderness, guarding, or rebound.  No organomegaly or hernia.  Bowel sounds are present.  No CVA tenderness or flank mass.  No abdominal bruits or thrills present upon auscultation/palpation.  NEUROLOGIC:  No focal sensory or motor deficits are noted.   PSYCHIATRIC:  The patient is awake, alert.  Appropriate mood and affect.  Calm and cooperative with history and physical exam.  SKIN:  Warm, dry, and well perfused.      ED Course     ED Course as of 12/11/24 1643   Wed Dec 11, 2024   1440 Labs ordered.   1525 In to see patient and history/physical completed.    1635 Electrolytes normal.  Patient unable to give UA.  Family does not available around.  Will discharge back to nursing home for follow-up with PCP.  Return precautions given.                 Results for orders placed or performed during the hospital encounter of 12/11/24 (from the past 24 hours)   CBC with platelets   Result Value Ref Range    WBC Count 5.6 4.0 - 11.0 10e3/uL    RBC Count 3.42 (L) 4.40 - 5.90 10e6/uL    Hemoglobin 11.5 (L) 13.3 - 17.7 g/dL    Hematocrit 35.5 (L) 40.0 - 53.0 %     (H) 78 - 100 fL    MCH 33.6 (H) 26.5 - 33.0 pg    MCHC 32.4 31.5 - 36.5 g/dL    RDW 12.6 10.0 - 15.0 %    Platelet Count 195 150 - 450 10e3/uL   Basic metabolic panel   Result Value Ref Range    Sodium 138 135 - 145 mmol/L    Potassium 4.7 3.4 - 5.3 mmol/L    Chloride 102 98 - 107 mmol/L    Carbon Dioxide (CO2) 26 22 - 29 mmol/L    Anion Gap 10 7 - 15 mmol/L    Urea Nitrogen 22.7 8.0 - 23.0 mg/dL    Creatinine 0.92 0.67 - 1.17 mg/dL    GFR Estimate 78 >60 mL/min/1.73m2    Calcium 9.8 8.8 - 10.4 mg/dL    Glucose 89 70 - 99 mg/dL   Extra Tube    Narrative    The following orders were created for panel order Extra Tube.  Procedure                               Abnormality         Status                     ---------                                -----------         ------                     Extra Blue Top Tube[[id number]]                                                         Extra Red Top Tube[[id number]]                               Final result               Extra Heparinized Syringe[[id number]]                                                     Please view results for these tests on the individual orders.   Extra Red Top Tube   Result Value Ref Range    Hold Specimen [facility]        Medications - No data to display    Assessments & Plan (with Medical Decision Making)     I have reviewed the nursing notes.    I have reviewed the findings, diagnosis, plan and need for follow up with the patient.    Summary:  Patient presents to the ER today for agitation.  Potential diagnosis which have been considered and evaluated include UTI, electrolyte abnormality, medication reaction, dementia progression, as well as others. Many of these have been excluded using the various modalities and assessment as noted on the chart. At the present time, the diagnosis given seems to be the most likely agitation.  Upon arrival, vitals signs are normal.  The patient is alert and in no distress.  Patient acting normal self on arrival to the ER per the daughter.  Physical exam is not showing anything acute at this time.  Basic lab work obtained showing WBC of 5.6 with hemoglobin 11.5.  Electrolytes and renal functions normal.  UA not able to be obtained.  Patient unable to give UA.  Discussed with family and they defer waiting.  Will discharge back to nursing home to get UA and follow-up with PCP.  Return to ER if new or worsening symptoms.  Family verbalized understanding agrees with plan of care.  Patient discharged home with family.        Critical Care Time: None    Impression and plan discussed with patient. Questions answered, concerns addressed, indications for urgent re-evaluation reviewed, and  given. Patient/Parent/Caregiver agree with treatment plan  and have no further questions at this time.  AVS provided at discharge.    This document was prepared using a combination of typing and voice generated software.  While every attempt was made for accuracy, spelling and grammatical errors may exist.              New Prescriptions    No medications on file       Final diagnoses:   Dementia with agitation, unspecified dementia severity, unspecified dementia type (H)       12/11/2024   [facility]       Fracisco Conway APRN CNP  12/11/24 3758

## 2024-12-11 NOTE — ED NOTES
AVS reviewed with patient, daughter, and RN at [facility]. All questions and concerns answered. Education reviewed, pt states no further questions at this time.  Did tell nurse at [facility] we were unable to collect a UA and to try and get one at facility.

## 2024-12-11 NOTE — DISCHARGE INSTRUCTIONS
Follow-up with your primary care provider for reevaluation.  Contact your primary care provider if you have any questions or concerns.  Do not hesitate to return to the ER if any new or worsening symptoms.     Please read the attached instructions (if any).  They highlight more specific treatments and interventions for you at home.              Thank you for letting me participate in your care and wish you a fast and uneventful recovery,    Fracisco EPPERSON CNP    Do not hesitate to contact me with questions or concerns.  [email or web address]

## 2024-12-12 ENCOUNTER — DOCUMENTATION ONLY (OUTPATIENT)
Dept: OTHER | Facility: CLINIC | Age: 89
End: 2024-12-12

## 2024-12-16 ENCOUNTER — TELEPHONE (OUTPATIENT)
Dept: GERIATRICS | Facility: OTHER | Age: 89
End: 2024-12-16

## 2024-12-20 ENCOUNTER — APPOINTMENT (OUTPATIENT)
Dept: GERIATRICS | Facility: OTHER | Age: 89
End: 2024-12-20
Attending: FAMILY MEDICINE
Payer: MEDICARE

## 2024-12-20 DIAGNOSIS — Z53.9 ERRONEOUS ENCOUNTER--DISREGARD: Primary | ICD-10-CM

## 2024-12-23 ENCOUNTER — HOSPITAL ENCOUNTER (EMERGENCY)
Facility: HOSPITAL | Age: 89
Discharge: HOME | End: 2024-12-23
Attending: NURSE PRACTITIONER
Payer: MEDICARE

## 2024-12-23 VITALS
RESPIRATION RATE: 18 BRPM | OXYGEN SATURATION: 98 % | DIASTOLIC BLOOD PRESSURE: 93 MMHG | HEART RATE: 60 BPM | SYSTOLIC BLOOD PRESSURE: 133 MMHG | TEMPERATURE: 98 F

## 2024-12-23 DIAGNOSIS — F03.911 DEMENTIA WITH AGITATION, UNSPECIFIED DEMENTIA SEVERITY, UNSPECIFIED DEMENTIA TYPE (H): ICD-10-CM

## 2024-12-23 LAB
ALBUMIN UR-MCNC: NEGATIVE MG/DL
ANION GAP SERPL CALCULATED.3IONS-SCNC: 8 MMOL/L (ref 7–15)
APPEARANCE UR: CLEAR
BACTERIA #/AREA URNS HPF: ABNORMAL /HPF
BILIRUB UR QL STRIP: NEGATIVE
BUN SERPL-MCNC: 18.6 MG/DL (ref 8–23)
CALCIUM SERPL-MCNC: 9.3 MG/DL (ref 8.8–10.4)
CHLORIDE SERPL-SCNC: 101 MMOL/L (ref 98–107)
COLOR UR AUTO: ABNORMAL
CREAT SERPL-MCNC: 0.88 MG/DL (ref 0.67–1.17)
EGFRCR SERPLBLD CKD-EPI 2021: 81 ML/MIN/1.73M2
ERYTHROCYTE [DISTWIDTH] IN BLOOD BY AUTOMATED COUNT: 12 % (ref 10–15)
GLUCOSE SERPL-MCNC: 92 MG/DL (ref 70–99)
GLUCOSE UR STRIP-MCNC: NEGATIVE MG/DL
HCO3 SERPL-SCNC: 27 MMOL/L (ref 22–29)
HCT VFR BLD AUTO: 33.2 % (ref 40–53)
HGB BLD-MCNC: 10.8 G/DL (ref 13.3–17.7)
HGB UR QL STRIP: ABNORMAL
HOLD SPECIMEN: NORMAL
HOLD SPECIMEN: NORMAL
KETONES UR STRIP-MCNC: NEGATIVE MG/DL
LEUKOCYTE ESTERASE UR QL STRIP: NEGATIVE
MCH RBC QN AUTO: 33.5 PG (ref 26.5–33)
MCHC RBC AUTO-ENTMCNC: 32.5 G/DL (ref 31.5–36.5)
MCV RBC AUTO: 103 FL (ref 78–100)
MUCOUS THREADS #/AREA URNS LPF: PRESENT /LPF
NITRATE UR QL: NEGATIVE
PH UR STRIP: 6.5 [PH] (ref 4.7–8)
PLATELET # BLD AUTO: 244 10E3/UL (ref 150–450)
POTASSIUM SERPL-SCNC: 4.2 MMOL/L (ref 3.4–5.3)
RBC # BLD AUTO: 3.22 10E6/UL (ref 4.4–5.9)
RBC URINE: 10 /HPF
SODIUM SERPL-SCNC: 136 MMOL/L (ref 135–145)
SP GR UR STRIP: 1.01 (ref 1–1.03)
SQUAMOUS EPITHELIAL: <1 /HPF
UROBILINOGEN UR STRIP-MCNC: NORMAL MG/DL
WBC # BLD AUTO: 5.7 10E3/UL (ref 4–11)
WBC URINE: 1 /HPF

## 2024-12-23 PROCEDURE — 36415 COLL VENOUS BLD VENIPUNCTURE: CPT | Performed by: NURSE PRACTITIONER

## 2024-12-23 PROCEDURE — 85014 HEMATOCRIT: CPT | Performed by: NURSE PRACTITIONER

## 2024-12-23 PROCEDURE — 81003 URINALYSIS AUTO W/O SCOPE: CPT | Performed by: NURSE PRACTITIONER

## 2024-12-23 PROCEDURE — 99283 EMERGENCY DEPT VISIT LOW MDM: CPT

## 2024-12-23 PROCEDURE — 80048 BASIC METABOLIC PNL TOTAL CA: CPT | Performed by: NURSE PRACTITIONER

## 2024-12-23 PROCEDURE — 99283 EMERGENCY DEPT VISIT LOW MDM: CPT | Performed by: NURSE PRACTITIONER

## 2024-12-23 ASSESSMENT — COLUMBIA-SUICIDE SEVERITY RATING SCALE - C-SSRS: IS THE PATIENT NOT ABLE TO COMPLETE C-SSRS: UNABLE TO VERBALIZE

## 2024-12-23 ASSESSMENT — ACTIVITIES OF DAILY LIVING (ADL)
ADLS_ACUITY_SCORE: 41
ADLS_ACUITY_SCORE: 41

## 2024-12-23 NOTE — DISCHARGE INSTRUCTIONS
Follow-up with your primary care provider for reevaluation.  Contact your primary care provider if you have any questions or concerns.  Do not hesitate to return to the ER if any new or worsening symptoms.     Please read the attached instructions (if any).  They highlight more specific treatments and interventions for you at home.              Thank you for letting me participate in your care and wish you a fast and uneventful recovery,    Fracisco EPPERSON CNP    Do not hesitate to contact me with questions or concerns.  [email or web address]

## 2024-12-23 NOTE — ED NOTES
Bed: ED07  Expected date:   Expected time:   Means of arrival:   Comments:  Félix HOLGUIN, aggressive resident

## 2024-12-23 NOTE — ED PROVIDER NOTES
"  History     Chief Complaint   Patient presents with    Aggressive Behavior     HPI  Silas Fraser is a 89 year old individual with history of BPH, hypertension, GERD, CVA, dementia, comes in via EMS from nursing home for aggressive behavior and confusion.  Patient apparently has increased agitation and aggression and is \"obsessing over staff members\".  Patient sent in for evaluation.  Patient denies any issues at this time.    Allergies:  Allergies   Allergen Reactions    Promethazine Confusion    Cephalexin Hives       Problem List:    Patient Active Problem List    Diagnosis Date Noted    Nursing Home Visit 10/24/2024     Priority: Medium    GERD (gastroesophageal reflux disease) 10/07/2024     Priority: Medium    Acute respiratory failure with hypoxia (H) 05/19/2024     Priority: Medium    Aspiration pneumonia of right middle lobe (H) 05/18/2024     Priority: Medium    Symptomatic severe aortic stenosis with normal ejection fraction 11/28/2023     Priority: Medium    History of CVA (cerebrovascular accident) 05/25/2023     Priority: Medium    Person living in residential institution 05/25/2023     Priority: Medium    Dementia (H) 05/08/2023     Priority: Medium    Transient neurologic deficit 04/27/2023     Priority: Medium    Hypomagnesemia 01/30/2023     Priority: Medium    LAFB (left anterior fascicular block) 03/14/2022     Priority: Medium    RBBB 03/14/2022     Priority: Medium    Metabolic encephalopathy 11/09/2020     Priority: Medium    Severe sepsis without septic shock (H) 11/09/2020     Priority: Medium    History of nonmelanoma skin cancer 06/20/2017     Priority: Medium     SCC in situ left neck 2016      Health care directive on file 03/26/2014     Priority: Medium    Lumbar and sacral spondyloarthritis 03/03/2014     Priority: Medium    Actinic keratosis 06/17/2011     Priority: Medium    Other psoriasis 06/17/2011     Priority: Medium    Elevated TSH 09/16/2009     Priority: Medium     IMO " Update 10/11      Hyperlipidemia 09/16/2009     Priority: Medium     IMO Update 10/11      Benign prostatic hyperplasia with urinary obstruction 07/15/2009     Priority: Medium     Updated per 10/1/17 IMO import      Coronary atherosclerosis of native coronary artery 07/15/2009     Priority: Medium     IMO Update 10/11      Essential hypertension 06/15/2009     Priority: Medium        Past Medical History:    No past medical history on file.    Past Surgical History:    No past surgical history on file.    Family History:    No family history on file.    Social History:  Marital Status:   [2]        Medications:    acetaminophen (TYLENOL) 500 MG tablet  acetaminophen (TYLENOL) 500 MG tablet  aspirin 81 MG EC tablet  bisacodyl (DULCOLAX) 10 MG suppository  calcium carbonate (TUMS) 500 MG chewable tablet  clopidogrel (PLAVIX) 75 MG tablet  finasteride (PROSCAR) 5 MG tablet  hydrocortisone 1 % CREA cream  levothyroxine (SYNTHROID/LEVOTHROID) 125 MCG tablet  LORazepam (ATIVAN) 0.5 MG tablet  LORazepam (ATIVAN) 0.5 MG tablet  magnesium oxide (MAG-OX) 400 MG tablet  nystatin (MYCOSTATIN) 215486 UNIT/GM external cream  pantoprazole (PROTONIX) 40 MG EC tablet  ranolazine (RANEXA) 500 MG 12 hr tablet  senna (SENOKOT) 8.6 MG tablet  sertraline (ZOLOFT) 25 MG tablet  tamsulosin (FLOMAX) 0.4 MG capsule          Review of Systems   Unable to perform ROS: Dementia       Physical Exam   BP: 130/67  Pulse: 63  Temp: 98  F (36.7  C)  Resp: 18  SpO2: 95 %      GENERAL APPEARANCE:  The patient is a 89 year old well-developed, well-nourished individual that appears as stated age.  LUNGS:  Breathing is easy.  Breath sounds are equal and clear bilaterally.  No wheezes, rhonchi, or rales.  HEART:  Regular rate and rhythm with normal S1 and S2.  No murmurs, gallops, or rubs.  NEUROLOGIC:  No focal sensory or motor deficits are noted.    PSYCHIATRIC:  The patient is awake, alert.  Patient is disoriented to time and situation.   Pleasant mood and affect.  Calm and cooperative with history and physical exam.  SKIN:  Warm, dry, and well perfused.  Good turgor.  No lesions, nodules, or rashes are noted.  No bruising noted.  LYMPHATICS:  No supraclavicular, axillary, or groin adenopathy is noted.     ED Course     ED Course as of 12/23/24 1508   Mon Dec 23, 2024   1402 Labs ordered.   1403 In to see patient and history/physical completed.    1506 At this time labs are benign.  Hemoglobin 10.8 which appears to be baseline looking at old lab work.  Patient will be discharged back to nursing home.  Return precautions given.                 Results for orders placed or performed during the hospital encounter of 12/23/24 (from the past 24 hours)   UA with Microscopic reflex to Culture    Specimen: Urine, Catheter   Result Value Ref Range    Color Urine Light Yellow Colorless, Straw, Light Yellow, Yellow    Appearance Urine Clear Clear    Glucose Urine Negative Negative mg/dL    Bilirubin Urine Negative Negative    Ketones Urine Negative Negative mg/dL    Specific Gravity Urine 1.015 1.003 - 1.035    Blood Urine Small (A) Negative    pH Urine 6.5 4.7 - 8.0    Protein Albumin Urine Negative Negative mg/dL    Urobilinogen Urine Normal Normal, 2.0 mg/dL    Nitrite Urine Negative Negative    Leukocyte Esterase Urine Negative Negative    Bacteria Urine Few (A) None Seen /HPF    Mucus Urine Present (A) None Seen /LPF    RBC Urine 10 (H) <=2 /HPF    WBC Urine 1 <=5 /HPF    Squamous Epithelials Urine <1 <=1 /HPF    Narrative    Urine Culture not indicated   CBC with platelets   Result Value Ref Range    WBC Count 5.7 4.0 - 11.0 10e3/uL    RBC Count 3.22 (L) 4.40 - 5.90 10e6/uL    Hemoglobin 10.8 (L) 13.3 - 17.7 g/dL    Hematocrit 33.2 (L) 40.0 - 53.0 %     (H) 78 - 100 fL    MCH 33.5 (H) 26.5 - 33.0 pg    MCHC 32.5 31.5 - 36.5 g/dL    RDW 12.0 10.0 - 15.0 %    Platelet Count 244 150 - 450 10e3/uL   Basic metabolic panel   Result Value Ref Range     Sodium 136 135 - 145 mmol/L    Potassium 4.2 3.4 - 5.3 mmol/L    Chloride 101 98 - 107 mmol/L    Carbon Dioxide (CO2) 27 22 - 29 mmol/L    Anion Gap 8 7 - 15 mmol/L    Urea Nitrogen 18.6 8.0 - 23.0 mg/dL    Creatinine 0.88 0.67 - 1.17 mg/dL    GFR Estimate 81 >60 mL/min/1.73m2    Calcium 9.3 8.8 - 10.4 mg/dL    Glucose 92 70 - 99 mg/dL   Extra Tube    Narrative    The following orders were created for panel order Extra Tube.  Procedure                               Abnormality         Status                     ---------                               -----------         ------                     Extra Blue Top Tube[[id number]]                              In process                 Extra Red Top Tube[[id number]]                               In process                   Please view results for these tests on the individual orders.       Medications - No data to display    Assessments & Plan (with Medical Decision Making)     I have reviewed the nursing notes.    I have reviewed the findings, diagnosis, plan and need for follow up with the patient.    Summary:  Patient presents to the ER today for apparent confusion and agitation.  Potential diagnosis which have been considered and evaluated include dementia progression, electrolyte abnormality, UTI, as well as others. Many of these have been excluded using the various modalities and assessment as noted on the chart. At the present time, the diagnosis given seems to be the most likely agitation.  Upon arrival, vitals signs are normal.  The patient is alert but disoriented.  Patient is calm and cooperative.  Physical examination shows no acute abnormalities.  Lab work obtained showing WBC of 5.7 with hemoglobin of 10.8 which appears to be at patient baseline looking at old lab work.  Electrolytes and renal functions normal.  UA benign.  At this time patient is calm and cooperative with no acute findings.  Will discharge back to nursing home.  Follow-up with PCP and  return to ER if new or worsening symptoms.  Patient discharged back to the nursing home.        Critical Care Time: None    Impression and plan discussed with patient. Questions answered, concerns addressed, indications for urgent re-evaluation reviewed, and  given. Patient/Parent/Caregiver agree with treatment plan and have no further questions at this time.  AVS provided at discharge.    This document was prepared using a combination of typing and voice generated software.  While every attempt was made for accuracy, spelling and grammatical errors may exist.              New Prescriptions    No medications on file       Final diagnoses:   Dementia with agitation, unspecified dementia severity, unspecified dementia type (H)       12/23/2024   [facility]       Fracisco Conway APRN CNP  12/23/24 0608

## 2024-12-23 NOTE — ED NOTES
Patient has been calm and cooperative while in ED, sleeping unless socializing with staff. Declines concerns at this time.

## 2024-12-23 NOTE — ED TRIAGE NOTES
"Pt presents via [facility] from [facility]. Facility had concerns of behavioral issues such as agitation, aggression, obsessing over staff members and increased confusion. Patient hx of alzheimer and is [place] with bilat hearing aids. Arrives alert, cooperative conversing with staff. Reports the year is 1952 but states he is 89 years old. Facility also notes PCP is aware, rx Zyprexa HS but has not started. PRN ativan at 0830.     Facility reports he has had recent falls with no injury. Skin tears noted to medial right elbow, patient moving extremity without issues. States he fell in the garage. Also notes that he falls when he jayshree \"the girls\".     Bed alarm in place.   "

## 2024-12-27 ENCOUNTER — APPOINTMENT (OUTPATIENT)
Dept: CT IMAGING | Facility: HOSPITAL | Age: 89
End: 2024-12-27
Attending: NURSE PRACTITIONER
Payer: MEDICARE

## 2024-12-27 ENCOUNTER — APPOINTMENT (OUTPATIENT)
Dept: GENERAL RADIOLOGY | Facility: HOSPITAL | Age: 89
End: 2024-12-27
Attending: NURSE PRACTITIONER
Payer: MEDICARE

## 2024-12-27 PROCEDURE — 71045 X-RAY EXAM CHEST 1 VIEW: CPT

## 2024-12-27 PROCEDURE — 74177 CT ABD & PELVIS W/CONTRAST: CPT | Mod: MG

## 2024-12-27 PROCEDURE — G1010 CDSM STANSON: HCPCS

## 2024-12-27 NOTE — ED TRIAGE NOTES
Pt arrives via EMS from nursing home with reports of low blood pressures and increased confusion.     Triage Assessment (Adult)       Row Name 12/27/24 1345          Triage Assessment    Airway WDL WDL

## 2024-12-27 NOTE — ED PROVIDER NOTES
History     Chief Complaint   Patient presents with    Altered Mental Status     HPI  Silas Fraser is a 89 year old individual with history of BPH, hypertension, GERD, CVA, dementia, comes in via EMS from nursing home for lethargy and hypotension.  Patient apparently lethargic today at the nursing home.  EMS reports low blood pressure.  1 L 0.9% NS initiated prior to arrival.  Unable to get much information from patient's due to history of dementia.    Allergies:  Allergies   Allergen Reactions    Promethazine Confusion    Cephalexin Hives       Problem List:    Patient Active Problem List    Diagnosis Date Noted    Nursing Home Visit 10/24/2024     Priority: Medium    GERD (gastroesophageal reflux disease) 10/07/2024     Priority: Medium    Acute respiratory failure with hypoxia (H) 05/19/2024     Priority: Medium    Aspiration pneumonia of right middle lobe (H) 05/18/2024     Priority: Medium    Symptomatic severe aortic stenosis with normal ejection fraction 11/28/2023     Priority: Medium    History of CVA (cerebrovascular accident) 05/25/2023     Priority: Medium    Person living in residential institution 05/25/2023     Priority: Medium    Dementia (H) 05/08/2023     Priority: Medium    Transient neurologic deficit 04/27/2023     Priority: Medium    Hypomagnesemia 01/30/2023     Priority: Medium    LAFB (left anterior fascicular block) 03/14/2022     Priority: Medium    RBBB 03/14/2022     Priority: Medium    Metabolic encephalopathy 11/09/2020     Priority: Medium    Severe sepsis without septic shock (H) 11/09/2020     Priority: Medium    History of nonmelanoma skin cancer 06/20/2017     Priority: Medium     SCC in situ left neck 2016      Health care directive on file 03/26/2014     Priority: Medium    Lumbar and sacral spondyloarthritis 03/03/2014     Priority: Medium    Actinic keratosis 06/17/2011     Priority: Medium    Other psoriasis 06/17/2011     Priority: Medium    Elevated TSH 09/16/2009      Priority: Medium     IMO Update 10/11      Hyperlipidemia 09/16/2009     Priority: Medium     IMO Update 10/11      Benign prostatic hyperplasia with urinary obstruction 07/15/2009     Priority: Medium     Updated per 10/1/17 IMO import      Coronary atherosclerosis of native coronary artery 07/15/2009     Priority: Medium     IMO Update 10/11      Essential hypertension 06/15/2009     Priority: Medium        Past Medical History:    No past medical history on file.    Past Surgical History:    No past surgical history on file.    Family History:    No family history on file.    Social History:  Marital Status:   [2]        Medications:    acetaminophen (TYLENOL) 500 MG tablet  acetaminophen (TYLENOL) 500 MG tablet  aspirin 81 MG EC tablet  bisacodyl (DULCOLAX) 10 MG suppository  calcium carbonate (TUMS) 500 MG chewable tablet  clopidogrel (PLAVIX) 75 MG tablet  finasteride (PROSCAR) 5 MG tablet  hydrocortisone 1 % CREA cream  levothyroxine (SYNTHROID/LEVOTHROID) 125 MCG tablet  LORazepam (ATIVAN) 0.5 MG tablet  LORazepam (ATIVAN) 0.5 MG tablet  magnesium oxide (MAG-OX) 400 MG tablet  nystatin (MYCOSTATIN) 906210 UNIT/GM external cream  pantoprazole (PROTONIX) 40 MG EC tablet  ranolazine (RANEXA) 500 MG 12 hr tablet  senna (SENOKOT) 8.6 MG tablet  sertraline (ZOLOFT) 25 MG tablet  tamsulosin (FLOMAX) 0.4 MG capsule          Review of Systems   Unable to perform ROS: Dementia       Physical Exam   BP: 114/63  Pulse: 89  Temp: 99.6  F (37.6  C)  Resp: 16  SpO2: (!) 91 %      GENERAL APPEARANCE:  The patient is a 89 year old well-developed, thin individual that appears as stated age.  LUNGS:  Breathing is easy.  Breath sounds are equal and clear bilaterally.  No wheezes, rhonchi, or rales.  HEART:  Regular rate and rhythm with normal S1 and S2.  No murmurs, gallops, or rubs.  ABDOMEN:  Soft.  No mass, tenderness, guarding, or rebound.  No organomegaly or hernia.  Bowel sounds are present.  No CVA tenderness or  flank mass.  No abdominal bruits or thrills present upon auscultation/palpation.  NEUROLOGIC:  No focal sensory or motor deficits are noted.   PSYCHIATRIC:  The patient is drowsy but does not follow commands.  Cooperative with history and physical exam.  SKIN:  Warm, dry, and well perfused.  Good turgor.  No lesions, nodules, or rashes are noted.  No bruising noted.      ED Course     ED Course as of 12/27/24 2344   Fri Dec 27, 2024   1749 Labs ordered.   1755 In to see patient and history/physical completed.    1942 Patient's power of  called and states the POLST is wrong and would like workup conducted along with IV antibiotics.  CTs of chest and abdomen ordered.  Sepsis workup initiated.  Vancomycin and meropenem ordered.   2047 Procalcitonin(!!): 13.59  High critical procalcitonin noted of 13.59.   2130 CT of chest/abdomen/pelvis conducted showing no acute findings.  Did have distention of bladder.  Marquez catheter ordered.   2254 UA with Microscopic reflex to Culture(!)  Shows moderate blood with moderate leukocyte esterase and 79 RBCs and 6 WBC's.   2311 Did notice fecal impaction on CT imaging when reviewed.  Went in to disimpact and patient was incontinent of stool at that time.  Rectal examination shows soft stool.  Did not proceed with further disimpaction.   2332 Discussed case with Dr. Sandoval.  Patient accepted for transfer to [facility]                 Results for orders placed or performed during the hospital encounter of 12/27/24 (from the past 24 hours)   XR Chest Port 1 View    Narrative    EXAM: XR CHEST PORT 1 VIEW  LOCATION: [facility]  DATE: 12/27/2024    INDICATION: Lethargy.  COMPARISON: None.      Impression    IMPRESSION:     Large hiatal hernia. Airspace opacity at the right lung base is favored to reflect compressive atelectasis related to the hiatal hernia, although infection could also be possible in the appropriate clinical scenario. No pleural  effusions or pneumothorax.    Cardiac size is within normal limits. Mild aortic atherosclerotic calcifications.    Diffusely demineralized bones.   CBC with platelets   Result Value Ref Range    WBC Count 26.6 (H) 4.0 - 11.0 10e3/uL    RBC Count 3.26 (L) 4.40 - 5.90 10e6/uL    Hemoglobin 10.9 (L) 13.3 - 17.7 g/dL    Hematocrit 33.4 (L) 40.0 - 53.0 %     (H) 78 - 100 fL    MCH 33.4 (H) 26.5 - 33.0 pg    MCHC 32.6 31.5 - 36.5 g/dL    RDW 12.3 10.0 - 15.0 %    Platelet Count 201 150 - 450 10e3/uL   Comprehensive metabolic panel   Result Value Ref Range    Sodium 139 135 - 145 mmol/L    Potassium 3.6 3.4 - 5.3 mmol/L    Carbon Dioxide (CO2) 24 22 - 29 mmol/L    Anion Gap 12 7 - 15 mmol/L    Urea Nitrogen 32.1 (H) 8.0 - 23.0 mg/dL    Creatinine 1.03 0.67 - 1.17 mg/dL    GFR Estimate 68 >60 mL/min/1.73m2    Calcium 9.9 8.8 - 10.4 mg/dL    Chloride 103 98 - 107 mmol/L    Glucose 144 (H) 70 - 99 mg/dL    Alkaline Phosphatase 50 40 - 150 U/L    AST 24 0 - 45 U/L    ALT 12 0 - 70 U/L    Protein Total 6.3 (L) 6.4 - 8.3 g/dL    Albumin 3.7 3.5 - 5.2 g/dL    Bilirubin Total 0.4 <=1.2 mg/dL   TSH with free T4 reflex   Result Value Ref Range    TSH 2.11 0.30 - 4.20 uIU/mL   Extra Tube    Narrative    The following orders were created for panel order Extra Tube.  Procedure                               Abnormality         Status                     ---------                               -----------         ------                     Extra Blue Top Tube[[id number]]                              Final result               Extra Red Top Tube[[id number]]                               Final result               Extra Green Top (Lithium...[[id number]]                      Final result                 Please view results for these tests on the individual orders.   Extra Blue Top Tube   Result Value Ref Range    Hold Specimen JIC    Extra Red Top Tube   Result Value Ref Range    Hold Specimen JIC    Extra Green Top (Lithium Heparin)  Tube   Result Value Ref Range    Hold Specimen JIC    Procalcitonin   Result Value Ref Range    Procalcitonin 13.59 (HH) <0.50 ng/mL   Blood gas venous   Result Value Ref Range    pH Venous 7.48 (H) 7.32 - 7.43    pCO2 Venous 34 (L) 40 - 50 mm Hg    pO2 Venous 67 (H) 25 - 47 mm Hg    Bicarbonate Venous 25 21 - 28 mmol/L    Base Excess/Deficit Venous 2.0 -3.0 - 3.0 mmol/L    FIO2 21     Oxyhemoglobin Venous 93 (H) 70 - 75 %    O2 Sat, Venous 95.0 (H) 70.0 - 75.0 %    Narrative    In healthy individuals, oxyhemoglobin (O2Hb) and oxygen saturation (SO2) are approximately equal. In the presence of dyshemoglobins, oxyhemoglobin can be considerably lower than oxygen saturation.   Lactic acid whole blood with 1x repeat in 2 hr when >2   Result Value Ref Range    Lactic Acid, Initial 1.2 0.7 - 2.0 mmol/L   CT Chest/Abdomen/Pelvis w Contrast    Narrative    EXAM: CT CHEST/ABDOMEN/PELVIS W CONTRAST  LOCATION: [facility]  DATE: 12/27/2024    INDICATION: Infection without source  COMPARISON: None.  TECHNIQUE: CT scan of the chest, abdomen, and pelvis was performed following injection of IV contrast. Multiplanar reformats were obtained. Dose reduction techniques were used.   CONTRAST: isovue 370 65mL  LIMITATIONS: Poor contrast and noise due to low-dose CT technique    FINDINGS:   LUNGS AND PLEURA: 4 mm left lower lobe pulmonary nodule, image 177 series 4. 3 mm left upper lobe pulmonary nodule, image 102. Bibasilar atelectasis, right greater than left.    MEDIASTINUM/AXILLAE: Large hiatal hernia containing the entire stomach. Stomach is inverted, however, no evidence of obstruction. Normal heart size.    Densely calcified aortic valve which may be seen with stenosis. Prior CABG.    CORONARY ARTERY CALCIFICATION: Previous intervention (stents or CABG).    HEPATOBILIARY: Cholecystectomy.    PANCREAS: Unremarkable    SPLEEN: Unremarkable    ADRENAL GLANDS: Unremarkable    KIDNEYS/BLADDER: Bilateral moderate cortical  scarring without hydronephrosis. Bladder is significantly distended.    BOWEL: Diverticulosis. Large fecal burden in the rectosigmoid.    LYMPH NODES: No significant retroperitoneal adenopathy    VASCULATURE: Advanced atherosclerotic disease with multifocal calcified and noncalcified plaque. At least 50% stenosis of the right common femoral artery, with suspected near occlusion of the left common femoral artery.    PELVIC ORGANS: Mild prostatic hypertrophy. No free fluid    MUSCULOSKELETAL: Diffuse osteopenia. Significant thoracic kyphosis. Multilevel spondylosis and facet arthropathy.      Impression    IMPRESSION:  1.  No source of infection identified in the chest, abdomen, or pelvis.  2.  Large hiatal hernia with inversion of the stomach.  3.  Distended bladder.  4.  Suspected high-grade stenosis of the left common femoral artery.  5.  Small pulmonary nodules. If prior studies are unavailable, 12 month follow-up CT recommended per Fleischner Society guidelines.  6.  Additional findings as above.   UA with Microscopic reflex to Culture    Specimen: Urine, Marquez Catheter   Result Value Ref Range    Color Urine Yellow Colorless, Straw, Light Yellow, Yellow    Appearance Urine Slightly Cloudy (A) Clear    Glucose Urine Negative Negative mg/dL    Bilirubin Urine Negative Negative    Ketones Urine Trace (A) Negative mg/dL    Specific Gravity Urine 1.025 1.003 - 1.035    Blood Urine Moderate (A) Negative    pH Urine 7.0 4.7 - 8.0    Protein Albumin Urine 20 (A) Negative mg/dL    Urobilinogen Urine 3.0 (A) Normal, 2.0 mg/dL    Nitrite Urine Negative Negative    Leukocyte Esterase Urine Moderate (A) Negative    Bacteria Urine Few (A) None Seen /HPF    Mucus Urine Present (A) None Seen /LPF    RBC Urine 79 (H) <=2 /HPF    WBC Urine 6 (H) <=5 /HPF    Squamous Epithelials Urine 0 <=1 /HPF    Narrative    Urine Culture ordered based on laboratory criteria       Medications   meropenem (MERREM) 1 g vial to attach to  mL  bag (0 g Intravenous Stopped 12/27/24 2103)   iopamidol (ISOVUE-370) solution 65 mL (65 mLs Intravenous $Given 12/27/24 2044)   sodium chloride (PF) 0.9% PF flush 60 mL (60 mLs Intravenous $Given 12/27/24 2044)   vancomycin (VANCOCIN) 1,500 mg in 0.9% NaCl 250 mL intermittent infusion (0 mg Intravenous Stopped 12/27/24 2307)   lidocaine (XYLOCAINE) 2 % external gel ( Urethral $Given 12/27/24 2235)       Assessments & Plan (with Medical Decision Making)     I have reviewed the nursing notes.    I have reviewed the findings, diagnosis, plan and need for follow up with the patient.    Summary:  Patient presents to the ER today for lethargy and hypotension.  Potential diagnosis which have been considered and evaluated include sepsis, UTI, medication reaction, MI/NSTEMI, as well as others. Many of these have been excluded using the various modalities and assessment as noted on the chart. At the present time, the diagnosis given seems to be the most likely sepsis of unknown origin, urinary retention, fecal impaction.  Upon arrival, vitals signs show blood pressure 114/63 with a pulse of 89.  Temperature 99.6  F.  Respirations 16 with oxygenation 91% on room air.  The patient is drowsy but opens eyes to verbal stimuli.  Patient does not follow commands.  Patient does seem more confused than usual as patient was recently seen by this provider just a few days prior.  Physical examination does not show any other differences from previous.  Patient has 1 L 0.9% NS infusing from EMS upon arrival.  Lab work obtained showing WBC of 26.6 with hemoglobin of 10.9 which is significantly changed from 5 days ago when it was 5.7.  Electrolytes, renal, hepatic functions benign.  TSH 2.11.  Patient refused straight cath and family deferred this is being retried.  Chest x-ray personally reviewed showing hiatal hernia with opacity in the right lung which radiology called likely compressive atelectasis.  Patient has a POLST saying DNR/DNI  with comfort based cares.  No IV antibiotics wanted.  At this time per POLST no significant workup will be done due to wishes of comfort cares noted on the POLST.  For this reason added on sepsis workup which showed lactic acid of 1.2 but high critical procalcitonin of 13.59.  Venous pH 7.48 with a CO2 of 34.  CT of chest/abdomen/pelvis was conducted showing no source of infection but patient was noted to have large hiatal hernia and distention of the urinary bladder.  Patient still not voiding so Marquez catheter placed.  UA showed moderate blood with moderate leukocyte esterase, 79 RBC's and 6 WBC's.  Urine culture is pending.  Did do personal review later of the CT scan showing fecal impaction.  Went into see patient for manual disimpaction and patient was already incontinent of stool.  Rectal examination did show soft stool so further disimpaction was not done.  Patient was given total of 1 L 0.9% NS and blood pressures did increase to the 90s-100s systolically.  Patient was treated for sepsis without source so meropenem 1 g IV and vancomycin given due to patient history of allergy to cephalosporins.  Patient did become more alert but is confused at this time.  With sepsis without source patient will need continued IV antibiotics.  No beds available at this facility.  Discussed case with [facility] Hospitalist Dr. Sandoval.  Patient accepted for transfer to [facility].        Critical Care Time: None    Impression and plan discussed with patient. Questions answered, concerns addressed, indications for urgent re-evaluation reviewed, and  given. Patient/Parent/Caregiver agree with treatment plan and have no further questions at this time.      This document was prepared using a combination of typing and voice generated software.  While every attempt was made for accuracy, spelling and grammatical errors may exist.              New Prescriptions    No medications on file       Final  diagnoses:   Sepsis (H)   Urinary retention   Fecal impaction (H)       12/27/2024   [facility]       Fracisco Conway, APRN CNP  12/27/24 2304

## 2024-12-28 NOTE — ED NOTES
Face to face report given with opportunity to observe patient.    Report given to BAUTISTA Yousif RN   12/28/2024  1:34 AM

## 2024-12-28 NOTE — ED NOTES
Patient had incontinent medium soft brown stool. Cleaned winnie area and changed bedding. Patient remained confused and non cooperative to commands. No change in condition since arrival.

## 2024-12-28 NOTE — PHARMACY-VANCOMYCIN DOSING SERVICE
Pharmacy received consult to dose vancomycin for ED for one time dosing. Indication(s): sepsis. Dosed at 25 mg/kg using weight of 60.4 kg which gave a calculated dose of 1500 mg. Please order another pharmacy to dose vancomycin consult if admitted and vancomycin is needed. Thank you.     Renita Vogel, PharmD  12/27/24

## 2024-12-28 NOTE — ED NOTES
"ED Triage Provider Note  [facility]  Encounter Date: Dec 27, 2024    History:  Chief Complaint   Patient presents with    Altered Mental Status     Silas RAY Fraser is a 89 year old male who presents to the ED with ***. {include 1-3 HPI elements}    Review of Systems:  {1 item required}    Exam:  BP 96/56   Pulse 91   Temp 99.6  F (37.6  C) (Tympanic)   Resp 16   SpO2 94%   General: No acute distress. Appears stated age.   Cardio: {normal/tachy/italo:293925::\"normal\"} rate, extremities well perfused  Resp: Normal work of breathing, grossly normal respiratory rate  Neuro: Alert. Facial movement grossly symmetric. Grossly intact strength.   {edit exam as needed, may add problem pertinent system}    Medical Decision Making:  Patient arriving to the ED with problem as above. A medical screening exam was performed. {Initial differential:281074::\"Initial differential diagnosis includes but not limited to ***.\"}    *** orders initiated from Triage. The patient is most appropriate to {ED Triage Destination:083772}.       Edna Walters RN  12/27/2024 at 6:51 PM  "

## 2024-12-30 ENCOUNTER — MEDICAL CORRESPONDENCE (OUTPATIENT)
Dept: HEALTH INFORMATION MANAGEMENT | Facility: CLINIC | Age: 89
End: 2024-12-30

## 2025-01-01 LAB
BACTERIA BLD CULT: ABNORMAL
BACTERIA UR CULT: ABNORMAL

## 2025-01-02 LAB — BACTERIA BLD CULT: NO GROWTH

## 2025-01-03 LAB — BACTERIA UR CULT: ABNORMAL

## 2025-01-04 LAB — BACTERIA BLD CULT: ABNORMAL
